# Patient Record
Sex: FEMALE | Race: WHITE | NOT HISPANIC OR LATINO | URBAN - METROPOLITAN AREA
[De-identification: names, ages, dates, MRNs, and addresses within clinical notes are randomized per-mention and may not be internally consistent; named-entity substitution may affect disease eponyms.]

---

## 2022-07-22 ENCOUNTER — HOSPITAL ENCOUNTER (EMERGENCY)
Facility: CLINIC | Age: 52
Discharge: HOME | End: 2022-07-23
Attending: EMERGENCY MEDICINE | Admitting: EMERGENCY MEDICINE
Payer: COMMERCIAL

## 2022-07-22 DIAGNOSIS — R11.2 NON-INTRACTABLE VOMITING WITH NAUSEA, UNSPECIFIED VOMITING TYPE: ICD-10-CM

## 2022-07-22 PROCEDURE — 96361 HYDRATE IV INFUSION ADD-ON: CPT

## 2022-07-22 PROCEDURE — 96375 TX/PRO/DX INJ NEW DRUG ADDON: CPT

## 2022-07-22 PROCEDURE — 258N000003 HC RX IP 258 OP 636: Performed by: EMERGENCY MEDICINE

## 2022-07-22 PROCEDURE — 250N000011 HC RX IP 250 OP 636: Performed by: EMERGENCY MEDICINE

## 2022-07-22 PROCEDURE — 93005 ELECTROCARDIOGRAM TRACING: CPT

## 2022-07-22 PROCEDURE — 99285 EMERGENCY DEPT VISIT HI MDM: CPT | Mod: 25

## 2022-07-22 PROCEDURE — 96374 THER/PROPH/DIAG INJ IV PUSH: CPT

## 2022-07-22 RX ORDER — ONDANSETRON 2 MG/ML
4 INJECTION INTRAMUSCULAR; INTRAVENOUS ONCE
Status: COMPLETED | OUTPATIENT
Start: 2022-07-22 | End: 2022-07-22

## 2022-07-22 RX ORDER — LORAZEPAM 2 MG/ML
1 INJECTION INTRAMUSCULAR ONCE
Status: COMPLETED | OUTPATIENT
Start: 2022-07-22 | End: 2022-07-23

## 2022-07-22 RX ADMIN — SODIUM CHLORIDE 1000 ML: 9 INJECTION, SOLUTION INTRAVENOUS at 23:15

## 2022-07-22 RX ADMIN — ONDANSETRON 4 MG: 2 INJECTION INTRAMUSCULAR; INTRAVENOUS at 23:13

## 2022-07-23 ENCOUNTER — APPOINTMENT (OUTPATIENT)
Dept: GENERAL RADIOLOGY | Facility: CLINIC | Age: 52
End: 2022-07-23
Attending: EMERGENCY MEDICINE
Payer: COMMERCIAL

## 2022-07-23 VITALS
DIASTOLIC BLOOD PRESSURE: 99 MMHG | SYSTOLIC BLOOD PRESSURE: 130 MMHG | RESPIRATION RATE: 20 BRPM | WEIGHT: 293 LBS | OXYGEN SATURATION: 91 % | TEMPERATURE: 97 F | HEART RATE: 65 BPM

## 2022-07-23 LAB
ALBUMIN SERPL-MCNC: 3.5 G/DL (ref 3.4–5)
ALP SERPL-CCNC: 90 U/L (ref 40–150)
ALT SERPL W P-5'-P-CCNC: 28 U/L (ref 0–50)
ANION GAP SERPL CALCULATED.3IONS-SCNC: 4 MMOL/L (ref 3–14)
AST SERPL W P-5'-P-CCNC: 22 U/L (ref 0–45)
ATRIAL RATE - MUSE: 67 BPM
BASOPHILS # BLD AUTO: 0 10E3/UL (ref 0–0.2)
BASOPHILS NFR BLD AUTO: 1 %
BILIRUB SERPL-MCNC: 0.4 MG/DL (ref 0.2–1.3)
BUN SERPL-MCNC: 21 MG/DL (ref 7–30)
CALCIUM SERPL-MCNC: 9 MG/DL (ref 8.5–10.1)
CHLORIDE BLD-SCNC: 108 MMOL/L (ref 94–109)
CO2 SERPL-SCNC: 29 MMOL/L (ref 20–32)
CREAT SERPL-MCNC: 1.22 MG/DL (ref 0.52–1.04)
DIASTOLIC BLOOD PRESSURE - MUSE: NORMAL MMHG
EOSINOPHIL # BLD AUTO: 0 10E3/UL (ref 0–0.7)
EOSINOPHIL NFR BLD AUTO: 0 %
ERYTHROCYTE [DISTWIDTH] IN BLOOD BY AUTOMATED COUNT: 15.9 % (ref 10–15)
GFR SERPL CREATININE-BSD FRML MDRD: 54 ML/MIN/1.73M2
GLUCOSE BLD-MCNC: 125 MG/DL (ref 70–99)
HCT VFR BLD AUTO: 38.9 % (ref 35–47)
HGB BLD-MCNC: 11.9 G/DL (ref 11.7–15.7)
IMM GRANULOCYTES # BLD: 0 10E3/UL
IMM GRANULOCYTES NFR BLD: 0 %
INTERPRETATION ECG - MUSE: NORMAL
LYMPHOCYTES # BLD AUTO: 1.1 10E3/UL (ref 0.8–5.3)
LYMPHOCYTES NFR BLD AUTO: 16 %
MCH RBC QN AUTO: 25.5 PG (ref 26.5–33)
MCHC RBC AUTO-ENTMCNC: 30.6 G/DL (ref 31.5–36.5)
MCV RBC AUTO: 83 FL (ref 78–100)
MONOCYTES # BLD AUTO: 0.3 10E3/UL (ref 0–1.3)
MONOCYTES NFR BLD AUTO: 5 %
NEUTROPHILS # BLD AUTO: 5.2 10E3/UL (ref 1.6–8.3)
NEUTROPHILS NFR BLD AUTO: 78 %
NRBC # BLD AUTO: 0 10E3/UL
NRBC BLD AUTO-RTO: 0 /100
P AXIS - MUSE: 48 DEGREES
PLATELET # BLD AUTO: 272 10E3/UL (ref 150–450)
POTASSIUM BLD-SCNC: 4.7 MMOL/L (ref 3.4–5.3)
PR INTERVAL - MUSE: 160 MS
PROT SERPL-MCNC: 6.8 G/DL (ref 6.8–8.8)
QRS DURATION - MUSE: 88 MS
QT - MUSE: 418 MS
QTC - MUSE: 441 MS
R AXIS - MUSE: 35 DEGREES
RBC # BLD AUTO: 4.67 10E6/UL (ref 3.8–5.2)
SODIUM SERPL-SCNC: 141 MMOL/L (ref 133–144)
SYSTOLIC BLOOD PRESSURE - MUSE: NORMAL MMHG
T AXIS - MUSE: 35 DEGREES
VENTRICULAR RATE- MUSE: 67 BPM
WBC # BLD AUTO: 6.6 10E3/UL (ref 4–11)

## 2022-07-23 PROCEDURE — 85025 COMPLETE CBC W/AUTO DIFF WBC: CPT | Performed by: EMERGENCY MEDICINE

## 2022-07-23 PROCEDURE — 36415 COLL VENOUS BLD VENIPUNCTURE: CPT | Performed by: EMERGENCY MEDICINE

## 2022-07-23 PROCEDURE — 71046 X-RAY EXAM CHEST 2 VIEWS: CPT

## 2022-07-23 PROCEDURE — 250N000011 HC RX IP 250 OP 636: Performed by: EMERGENCY MEDICINE

## 2022-07-23 PROCEDURE — 80053 COMPREHEN METABOLIC PANEL: CPT | Performed by: EMERGENCY MEDICINE

## 2022-07-23 RX ADMIN — LORAZEPAM 1 MG: 2 INJECTION INTRAMUSCULAR at 00:09

## 2022-07-23 ASSESSMENT — ENCOUNTER SYMPTOMS
DIZZINESS: 1
ABDOMINAL PAIN: 0
NERVOUS/ANXIOUS: 1
COUGH: 1
FEVER: 0
NAUSEA: 1
SHORTNESS OF BREATH: 0
VOMITING: 1

## 2022-07-23 NOTE — ED PROVIDER NOTES
"  History   Chief Complaint:  Nausea & Vomiting       The history is provided by the patient.      Yessy Ryan is a 51 year old female who presents with nausea and multiple episodes of vomiting. Symptoms began tonight at 1900 approximately one hour after she ate an edible containing marijuana. Associated symptoms include dizziness and a \"choking\" sensation. She often eats edibles, but this edible was \"delta 10 instead of delta 8\" which was new for her. While waiting in the ER, she received interventions that mildly improved her nausea. Patient has a past medical history including depression and anxiety which she treats with medication. She denies any \"substantial\" previous surgical history.    Review of Systems   Constitutional: Negative for fever.   Respiratory: Positive for cough. Negative for shortness of breath.    Cardiovascular: Negative for chest pain.   Gastrointestinal: Positive for nausea and vomiting. Negative for abdominal pain.   Neurological: Positive for dizziness.   Psychiatric/Behavioral: The patient is nervous/anxious.    All other systems reviewed and are negative.        Allergies:  Morphine     Medications:  Antidepressant     Past Medical History:     Anxiety   Depression     Past Surgical History:    Patient denies \"substantial\" surgical history    Social History:  Present with her .   Often consumes marijuana edibles.       Physical Exam     Patient Vitals for the past 24 hrs:   BP Temp Temp src Pulse Resp SpO2 Weight   07/23/22 0230 (!) 130/99 -- -- 65 -- 91 % --   07/23/22 0215 128/82 -- -- 66 -- 92 % --   07/23/22 0200 137/85 -- -- 65 -- 96 % --   07/23/22 0145 134/85 -- -- 64 -- 97 % --   07/23/22 0130 129/81 -- -- 65 -- 94 % --   07/23/22 0115 131/88 -- -- 70 -- 96 % --   07/23/22 0100 130/76 -- -- 84 -- 91 % --   07/23/22 0000 128/74 -- -- 70 -- (!) 84 % --   07/22/22 2347 -- -- -- -- -- 92 % --   07/22/22 2345 135/81 -- -- 74 -- 95 % --   07/22/22 2344 -- -- -- -- -- (!) 89 " % --   07/22/22 2342 -- -- -- -- -- 91 % --   07/22/22 2340 (!) 141/111 -- -- 69 -- 96 % --   07/22/22 2309 -- -- -- -- -- -- 142.9 kg (315 lb)   07/22/22 2308 -- -- -- -- 20 -- --   07/22/22 2255 116/58 97  F (36.1  C) Temporal 75 -- 96 % --       Physical Exam  General: Appears well-developed and well-nourished.   Head: No signs of trauma.   CV: Normal rate and regular rhythm.    Resp: Effort normal and breath sounds normal. No respiratory distress.   GI: Soft. There is no tenderness.  No rebound or guarding.  Normal bowel sounds.  No CVA tenderness.  MSK: Normal range of motion.   Neuro: The patient is alert and oriented. Speech normal.  Skin: Skin is warm and dry. No rash noted.   Psych: anxious      Emergency Department Course   ECG:  ECG results from 07/22/22   EKG 12-lead, tracing only     Value    Systolic Blood Pressure     Diastolic Blood Pressure     Ventricular Rate 67    Atrial Rate 67    NM Interval 160    QRS Duration 88        QTc 441    P Axis 48    R AXIS 35    T Axis 35    Interpretation ECG      Sinus rhythm  Normal ECG  No previous ECGs available  Confirmed by GENERATED REPORT, COMPUTER ([id number]),  Maico Ramos ([id number]) on 7/23/2022 12:46:30 AM         Imaging:  XR Chest 2 Views   Final Result   IMPRESSION: Mild cardiac enlargement. Normal pulmonary vascularity. Lungs clear. No pleural fluid or pneumothorax. Minimal degenerative changes in the spine and shoulders.        Report per radiology    Laboratory:  Labs Ordered and Resulted from Time of ED Arrival to Time of ED Departure   COMPREHENSIVE METABOLIC PANEL - Abnormal       Result Value    Sodium 141      Potassium 4.7      Chloride 108      Carbon Dioxide (CO2) 29      Anion Gap 4      Urea Nitrogen 21      Creatinine 1.22 (*)     Calcium 9.0      Glucose 125 (*)     Alkaline Phosphatase 90      AST 22      ALT 28      Protein Total 6.8      Albumin 3.5      Bilirubin Total 0.4      GFR Estimate 54 (*)    CBC WITH PLATELETS AND  DIFFERENTIAL - Abnormal    WBC Count 6.6      RBC Count 4.67      Hemoglobin 11.9      Hematocrit 38.9      MCV 83      MCH 25.5 (*)     MCHC 30.6 (*)     RDW 15.9 (*)     Platelet Count 272      % Neutrophils 78      % Lymphocytes 16      % Monocytes 5      % Eosinophils 0      % Basophils 1      % Immature Granulocytes 0      NRBCs per 100 WBC 0      Absolute Neutrophils 5.2      Absolute Lymphocytes 1.1      Absolute Monocytes 0.3      Absolute Eosinophils 0.0      Absolute Basophils 0.0      Absolute Immature Granulocytes 0.0      Absolute NRBCs 0.0            Emergency Department Course:             Reviewed:  I reviewed nursing notes and vitals    Assessments:  2350 I obtained history and examined the patient as noted above.   0215 I rechecked the patient and explained findings.     Interventions:  2313 Zofran 4 mg IV   2315 NS, 1 L, IV  0009 Ativan 1mg IV     Disposition:  The patient was discharged to home.     Impression & Plan       Medical Decision Making:  Yessy Ryan is a 51-year-old woman presents due to vomiting and anxiety.  She reports that symptoms started after eating an edible that had delta 10.  She states that she fairly regularly has delta 8 but the delta 10 was new for her.  Her physical exam was overall reassuring.  EKG did not show any concerning findings and blood work was reassuring.  Patient was concerned that she could have aspirated.  Her lung sounds were clear and she is not in respiratory stress and the chest x-ray was clear.  I did give patient fluids and a dose of Ativan.  With this she did feel better and was able to tolerate p.o.  I believe her symptoms are a reaction to the delta 10 and I recommended abstaining from this.  Patient was discharged home      Diagnosis:    ICD-10-CM    1. Non-intractable vomiting with nausea, unspecified vomiting type  R11.2        Scribe Disclosure:  Deejay ACHARYA, am serving as a scribe at 11:49 PM on 7/22/2022 to document services  personally performed by Phil Leon MD based on my observations and the provider's statements to me.          Phil Leon MD  07/23/22 0461

## 2022-07-23 NOTE — ED TRIAGE NOTES
Triage Assessment     Row Name 07/22/22 2316       Respiratory WDL    Respiratory WDL WDL       Skin Circulation/Temperature WDL    Skin Circulation/Temperature WDL WDL       Cardiac WDL    Cardiac WDL WDL       Peripheral/Neurovascular WDL    Peripheral Neurovascular WDL WDL       Cognitive/Neuro/Behavioral WDL    Cognitive/Neuro/Behavioral WDL WDL

## 2022-08-24 PROBLEM — E66.01 CLASS 3 SEVERE OBESITY IN ADULT (H): Status: ACTIVE | Noted: 2022-08-24

## 2022-08-24 PROBLEM — F41.9 ANXIETY AND DEPRESSION: Status: ACTIVE | Noted: 2022-08-24

## 2022-08-24 PROBLEM — F32.A ANXIETY AND DEPRESSION: Status: ACTIVE | Noted: 2022-08-24

## 2022-08-24 PROBLEM — M13.0 POLYARTHRITIS: Status: ACTIVE | Noted: 2022-08-24

## 2022-08-24 PROBLEM — E66.813 CLASS 3 SEVERE OBESITY IN ADULT (H): Status: ACTIVE | Noted: 2022-08-24

## 2022-08-24 PROBLEM — R19.00 PELVIC MASS: Status: ACTIVE | Noted: 2022-08-24

## 2022-09-27 ENCOUNTER — HOSPITAL ENCOUNTER (EMERGENCY)
Facility: CLINIC | Age: 52
Discharge: HOME | End: 2022-09-27
Payer: COMMERCIAL

## 2022-09-27 VITALS
RESPIRATION RATE: 18 BRPM | OXYGEN SATURATION: 99 % | WEIGHT: 293 LBS | DIASTOLIC BLOOD PRESSURE: 109 MMHG | SYSTOLIC BLOOD PRESSURE: 154 MMHG | BODY MASS INDEX: 44.41 KG/M2 | HEIGHT: 68 IN | HEART RATE: 77 BPM | TEMPERATURE: 98.4 F

## 2022-09-27 DIAGNOSIS — R03.0 ELEVATED BLOOD PRESSURE READING WITHOUT DIAGNOSIS OF HYPERTENSION: ICD-10-CM

## 2022-09-27 DIAGNOSIS — J06.9 VIRAL UPPER RESPIRATORY TRACT INFECTION: ICD-10-CM

## 2022-09-27 DIAGNOSIS — R11.2 NAUSEA AND VOMITING, INTRACTABILITY OF VOMITING NOT SPECIFIED, UNSPECIFIED VOMITING TYPE: ICD-10-CM

## 2022-09-27 DIAGNOSIS — J45.901 MILD ASTHMA EXACERBATION: ICD-10-CM

## 2022-09-27 LAB
ALBUMIN SERPL-MCNC: 3.7 G/DL (ref 3.4–5)
ALP SERPL-CCNC: 97 U/L (ref 40–150)
ALT SERPL W P-5'-P-CCNC: 31 U/L (ref 0–50)
ANION GAP SERPL CALCULATED.3IONS-SCNC: 8 MMOL/L (ref 3–14)
AST SERPL W P-5'-P-CCNC: 26 U/L (ref 0–45)
BASOPHILS # BLD AUTO: 0 10E3/UL (ref 0–0.2)
BASOPHILS NFR BLD AUTO: 0 %
BILIRUB SERPL-MCNC: 0.8 MG/DL (ref 0.2–1.3)
BUN SERPL-MCNC: 16 MG/DL (ref 7–30)
CALCIUM SERPL-MCNC: 9.2 MG/DL (ref 8.5–10.1)
CHLORIDE BLD-SCNC: 105 MMOL/L (ref 94–109)
CO2 SERPL-SCNC: 26 MMOL/L (ref 20–32)
CREAT SERPL-MCNC: 1 MG/DL (ref 0.52–1.04)
EOSINOPHIL # BLD AUTO: 0.1 10E3/UL (ref 0–0.7)
EOSINOPHIL NFR BLD AUTO: 1 %
ERYTHROCYTE [DISTWIDTH] IN BLOOD BY AUTOMATED COUNT: 15.4 % (ref 10–15)
FLUAV RNA SPEC QL NAA+PROBE: NEGATIVE
FLUBV RNA RESP QL NAA+PROBE: NEGATIVE
GFR SERPL CREATININE-BSD FRML MDRD: 68 ML/MIN/1.73M2
GLUCOSE BLD-MCNC: 122 MG/DL (ref 70–99)
HCT VFR BLD AUTO: 42.7 % (ref 35–47)
HGB BLD-MCNC: 13.3 G/DL (ref 11.7–15.7)
IMM GRANULOCYTES # BLD: 0 10E3/UL
IMM GRANULOCYTES NFR BLD: 0 %
LYMPHOCYTES # BLD AUTO: 0.6 10E3/UL (ref 0.8–5.3)
LYMPHOCYTES NFR BLD AUTO: 8 %
MCH RBC QN AUTO: 26.1 PG (ref 26.5–33)
MCHC RBC AUTO-ENTMCNC: 31.1 G/DL (ref 31.5–36.5)
MCV RBC AUTO: 84 FL (ref 78–100)
MONOCYTES # BLD AUTO: 0.3 10E3/UL (ref 0–1.3)
MONOCYTES NFR BLD AUTO: 5 %
NEUTROPHILS # BLD AUTO: 5.9 10E3/UL (ref 1.6–8.3)
NEUTROPHILS NFR BLD AUTO: 86 %
NRBC # BLD AUTO: 0 10E3/UL
NRBC BLD AUTO-RTO: 0 /100
PLATELET # BLD AUTO: 276 10E3/UL (ref 150–450)
POTASSIUM BLD-SCNC: 4.1 MMOL/L (ref 3.4–5.3)
PROT SERPL-MCNC: 7.8 G/DL (ref 6.8–8.8)
RBC # BLD AUTO: 5.1 10E6/UL (ref 3.8–5.2)
RSV RNA SPEC NAA+PROBE: NEGATIVE
SARS-COV-2 RNA RESP QL NAA+PROBE: NEGATIVE
SODIUM SERPL-SCNC: 139 MMOL/L (ref 133–144)
WBC # BLD AUTO: 7 10E3/UL (ref 4–11)

## 2022-09-27 PROCEDURE — 258N000003 HC RX IP 258 OP 636: Performed by: EMERGENCY MEDICINE

## 2022-09-27 PROCEDURE — 85004 AUTOMATED DIFF WBC COUNT: CPT | Performed by: EMERGENCY MEDICINE

## 2022-09-27 PROCEDURE — 96361 HYDRATE IV INFUSION ADD-ON: CPT

## 2022-09-27 PROCEDURE — 250N000011 HC RX IP 250 OP 636: Performed by: EMERGENCY MEDICINE

## 2022-09-27 PROCEDURE — 87637 SARSCOV2&INF A&B&RSV AMP PRB: CPT | Performed by: EMERGENCY MEDICINE

## 2022-09-27 PROCEDURE — 96374 THER/PROPH/DIAG INJ IV PUSH: CPT

## 2022-09-27 PROCEDURE — 96375 TX/PRO/DX INJ NEW DRUG ADDON: CPT

## 2022-09-27 PROCEDURE — 99284 EMERGENCY DEPT VISIT MOD MDM: CPT | Mod: 25,CS

## 2022-09-27 PROCEDURE — C9803 HOPD COVID-19 SPEC COLLECT: HCPCS

## 2022-09-27 PROCEDURE — 80053 COMPREHEN METABOLIC PANEL: CPT | Performed by: EMERGENCY MEDICINE

## 2022-09-27 PROCEDURE — 85025 COMPLETE CBC W/AUTO DIFF WBC: CPT | Performed by: EMERGENCY MEDICINE

## 2022-09-27 PROCEDURE — 36415 COLL VENOUS BLD VENIPUNCTURE: CPT | Performed by: EMERGENCY MEDICINE

## 2022-09-27 PROCEDURE — 250N000013 HC RX MED GY IP 250 OP 250 PS 637

## 2022-09-27 RX ORDER — ONDANSETRON 2 MG/ML
4 INJECTION INTRAMUSCULAR; INTRAVENOUS ONCE
Status: COMPLETED | OUTPATIENT
Start: 2022-09-27 | End: 2022-09-27

## 2022-09-27 RX ORDER — ACETAMINOPHEN 325 MG/1
650 TABLET ORAL ONCE
Status: COMPLETED | OUTPATIENT
Start: 2022-09-27 | End: 2022-09-27

## 2022-09-27 RX ORDER — ONDANSETRON 4 MG/1
4 TABLET, ORALLY DISINTEGRATING ORAL ONCE
Status: DISCONTINUED | OUTPATIENT
Start: 2022-09-27 | End: 2022-09-27 | Stop reason: HOSPADM

## 2022-09-27 RX ORDER — IBUPROFEN 400 MG/1
400 TABLET, FILM COATED ORAL ONCE
Status: COMPLETED | OUTPATIENT
Start: 2022-09-27 | End: 2022-09-27

## 2022-09-27 RX ORDER — DEXAMETHASONE SODIUM PHOSPHATE 4 MG/ML
10 INJECTION, SOLUTION INTRA-ARTICULAR; INTRALESIONAL; INTRAMUSCULAR; INTRAVENOUS; SOFT TISSUE ONCE
Status: COMPLETED | OUTPATIENT
Start: 2022-09-27 | End: 2022-09-27

## 2022-09-27 RX ORDER — ONDANSETRON 4 MG/1
4 TABLET, ORALLY DISINTEGRATING ORAL EVERY 8 HOURS PRN
Qty: 10 TABLET | Refills: 0 | Status: SHIPPED | OUTPATIENT
Start: 2022-09-27 | End: 2022-09-30

## 2022-09-27 RX ADMIN — ACETAMINOPHEN 650 MG: 325 TABLET, FILM COATED ORAL at 11:40

## 2022-09-27 RX ADMIN — DEXAMETHASONE SODIUM PHOSPHATE 10 MG: 4 INJECTION, SOLUTION INTRAMUSCULAR; INTRAVENOUS at 12:42

## 2022-09-27 RX ADMIN — ONDANSETRON 4 MG: 2 INJECTION INTRAMUSCULAR; INTRAVENOUS at 09:12

## 2022-09-27 RX ADMIN — IBUPROFEN 400 MG: 400 TABLET, FILM COATED ORAL at 11:40

## 2022-09-27 RX ADMIN — SODIUM CHLORIDE 1000 ML: 9 INJECTION, SOLUTION INTRAVENOUS at 09:11

## 2022-09-27 ASSESSMENT — ENCOUNTER SYMPTOMS
SHORTNESS OF BREATH: 0
SPEECH DIFFICULTY: 0
NUMBNESS: 0
CONFUSION: 0
VOMITING: 1
BACK PAIN: 0
SORE THROAT: 1
ABDOMINAL PAIN: 0
CHILLS: 0
FEVER: 1
WEAKNESS: 0
FACIAL ASYMMETRY: 0
DIZZINESS: 0
HEADACHES: 1
COUGH: 1
NAUSEA: 1

## 2022-09-27 NOTE — DISCHARGE INSTRUCTIONS
Discharge Instructions  Upper Respiratory Infection    The upper respiratory tract includes the sinuses, nasal passages, pharynx, and larynx. A URI, or upper respiratory infection, is an infection of any of the parts of the upper airway. Symptoms include runny nose, congestion, sneezing, sore throat, cough, and fever. URIs are almost always caused by a virus. Antibiotics do not help with viral infections, so are generally not prescribed. A URI is very contagious through coughing and nasal secretions; make sure you wash your hands often and clean surfaces after sneezing, coughing or touching them. While you should start to improve in 3 - 5 days, remember that sometimes a cough can linger for several weeks.    Generally, every Emergency Department visit should have a follow-up clinic visit with either a primary or a specialty clinic/provider. Please follow-up as instructed by your emergency provider today.    Return to the Emergency Department if:  Any of your symptoms get much worse.  You seem very sick, like being too weak to get up.  You have chest pain or shortness of breath.   You have a severe headache.  You are vomiting (throwing up) so much you cannot keep fluids or medicines down.  You have confusion or seem unusually drowsy.  You have a seizure.    What can I do to help myself?  Fill any prescriptions the provider gave you and take them right away  If you have a fever, get plenty of rest and drink lots of fluids, especially water.  Using a humidifier or saline nose spray will also help loosen mucous.   Clothes or blankets will not change your fever. Do what is comfortable for you.  Bathing or sponging in lukewarm water may help you feel better.  Acetaminophen (Tylenol ) or ibuprofen (Advil , Motrin ) will help bring fever down and may help you feel more comfortable. Be sure to read and follow the package directions, and ask your provider if you have questions.  Do not drink alcohol.  Decongestants may help  you feel better. You may use decongestant nose sprays Afrin  (oxymetazoline) or Glynn-Synephrine  (phenylephrine hydrochloride) for up to 3 days, or may use a decongestant tablet like Sudafed  (pseudoephedrine).  If you were given a prescription for medicine here today, be sure to read all of the information (including the package insert) that comes with your prescription.  This will include important information about the medicine, its side effects, and any warnings that you need to know about.  The pharmacist who fills the prescription can provide more information and answer questions you may have about the medicine.  If you have questions or concerns that the pharmacist cannot address, please call or return to the Emergency Department.   Remember that you can always come back to the Emergency Department if you are not able to see your regular provider in the amount of time listed above, if you get any new symptoms, or if there is anything that worries you.    Discharge Instructions  Vomiting    You have been seen today for vomiting (throwing up). This is usually caused by a virus, but some bacteria, parasites, medicines or other medical conditions can cause similar symptoms. At this time your provider does not find that your vomiting is a sign of anything dangerous or life-threatening. However, sometimes the signs of serious illness do not show up right away. If you have new or worse symptoms, you may need to be seen again in the Emergency Department or by your primary provider. Remember that serious problems like appendicitis can start as vomiting.    Generally, every Emergency Department visit should have a follow-up clinic visit with either a primary or a specialty clinic/provider. Please follow-up as instructed by your emergency provider today.    Return to the Emergency Department if:  You keep vomiting and you are not able to keep liquids down.   You feel you are getting dehydrated, such as being very  thirsty, not urinating (peeing) at least every 8-12 hours, or feeling faint or lightheaded.   You develop a new fever, or your fever continues for more than 2 days.   You have abdominal (belly pain) that seems worse than cramps, is in one spot, or is getting worse over time. Appendicitis usually causes pain in the right lower abdomen (to the right and below your belly button) so watch for pain in this location.  You have blood in your vomit or stools.   You feel very weak.  You are not starting to improve within 24 hours of your visit here.     What can I do to help myself?  The most important thing to do is to drink clear liquids. If you have been vomiting a lot, it is best to have only small, frequent sips of liquids. Drinking too much at once may cause more vomiting. If you are vomiting often, you must replace minerals, sodium and potassium lost with your illness. Pedialyte  is the best available rehydration liquid but some find that it doesn t taste good so sports drinks are an alterative. You can also drink clear liquids such as water, weak tea, apple juice, and 7-Up . Avoid acid liquids (orange), caffeine (coffee) or alcohol. Do not drink milk until you no longer have diarrhea (loose stools).   After liquids are staying down, you may start eating mild foods. Soda crackers, toast, plain noodles, gelatin, applesauce and bananas are good first choices. Avoid foods that have acid, are spicy, fatty or have a lot of fiber (such as meats, coarse grains, vegetables). You may start eating these foods again in about 3 days when you are better.   Sometimes treatment includes prescription medicine to prevent nausea (sick to your stomach) and vomiting. If your provider prescribes these for you, take them as directed.   Do not take ibuprofen, naproxen, or other nonsteroidal anti-inflammatory (NSAID) medicines without checking with your healthcare provider.     If you were given a prescription for medicine here today, be  sure to read all of the information (including the package insert) that comes with your prescription.  This will include important information about the medicine, its side effects, and any warnings that you need to know about.  The pharmacist who fills the prescription can provide more information and answer questions you may have about the medicine.  If you have questions or concerns that the pharmacist cannot address, please call or return to the Emergency Department.     Remember that you can always come back to the Emergency Department if you are not able to see your regular provider in the amount of time listed above, if you get any new symptoms, or if there is anything that worries you.    Discharge Instructions  Hypertension - High Blood Pressure    During you visit to the Emergency Department, your blood pressure was higher than the recommended blood pressure.  This may be related to stress, pain, medication or other temporary conditions. In these cases, your blood pressure may return to normal on its own. If you have a history of high blood pressure, you may need to have your provider adjust your medications. Sometimes, your high measurement here may indicate that you have developed high blood pressure that will stay high unless it is treated. As a general rule, high blood pressure causes problems over years rather than days, weeks, or months. So, while it is important to treat blood pressure, it is rarely important to treat blood pressure immediately. Occasionally we will begin a medication in the Emergency Department; more often we will recommend close follow-up for medications with a primary doctor/clinic.    Generally, every Emergency Department visit should have a follow-up clinic visit with either a primary or a specialty clinic/provider. Please follow-up as instructed by your emergency provider today.    Return to the Emergency Department if you start to have:  A severe headache.  Chest  pain.  Shortness of breath.  Weakness or numbness that affects one part of the body.  Confusion.  Vision changes.  Significant swelling of legs and/or eyes.  A reaction to any medication started in the Emergency Department.    What can I do to help myself?  Avoid alcohol.  Take any blood pressure medicine that you are prescribed.  Get a good night s sleep.  Lower your salt intake.  Exercise.  Lose weight.  Manage stress.  See your doctor regularly    If blood pressure medication was started in the Emergency Department:  The medicine may not have an immediate effect. The body and brain determine what blood pressure you have. The medicine s job is to retrain the body s  thermostat  to a lower blood pressure.  You will need to follow up with your provider to see how this medicine is working for you.  If you were given a prescription for medicine here today, be sure to read all of the information (including the package insert) that comes with your prescription.  This will include important information about the medicine, its side effects, and any warnings that you need to know about.  The pharmacist who fills the prescription can provide more information and answer questions you may have about the medicine.  If you have questions or concerns that the pharmacist cannot address, please call or return to the Emergency Department.   Remember that you can always come back to the Emergency Department if you are not able to see your regular provider in the amount of time listed above, if you get any new symptoms, or if there is anything that worries you.    Discharge Instructions  Asthma    Asthma is a condition causing narrowing and inflammation of the airways that can make it hard to breathe.  Asthma can also cause cough, wheezing, noisy breathing and tightness in the chest.  Asthma can be brought on or  triggered  by many things, including dust, mold, pollen, cigarette smoke, exercise, stress and infections (like the common  cold).     Generally, every Emergency Department visit should have a follow-up clinic visit with either a primary or a specialty clinic/provider. Please follow-up as instructed by your emergency provider today.    Return to the Emergency Department if:  Your breathing gets worse.  You need to use your inhaler more often than every 4 hours, or cannot get relief from your inhaler.  You are very weak, or feel much more ill.  You develop new symptoms, such as chest pain.  You cough up blood.  You are vomiting (throwing up) enough that you cannot keep fluids or your medicine down.    What can I do to help myself?  Fill any prescriptions the provider gave you and take them right away--especially antibiotics. Be sure to finish the whole antibiotic prescription.  You may be given a prescription for an inhaler, which can help loosen tight air passages.  Use this as needed, but not more often than directed. Inhalers work much better when used with a spacer.   You may be given a prescription for a steroid to reduce inflammation. Used long-term, these can have some side effects, but for short-term use they are safe. You may notice restlessness or increased appetite (eating more).      You may use non-prescription cough or cold medicines. Cough medicines may help, but do not make the cough go away completely.   Avoid smoke, because this can make you feel worse. If you smoke, this may be a good time to quit! Consider using nicotine lozenges, gum, or patches to reduce cravings.   If you have a fever, Tylenol  (acetaminophen), Motrin  (ibuprofen), or Advil  (ibuprofen), may help bring fever down and may help you feel more comfortable. Be sure to read and follow the package directions, and ask your provider if you have questions.  Be sure to get your flu shot each year.  For certain age groups, the pneumonia shot can help prevent pneumonia.  It is important that you follow up with your regular provider, to be sure that you are  improving from this spell (an acute asthma exacerbation), and also to do what you can to keep from having trouble again. Sometimes you need long-term medicines to keep your asthma under control.   If you were given a prescription for medicine here today, be sure to read all of the information (including the package insert) that comes with your prescription.  This will include important information about the medicine, its side effects, and any warnings that you need to know about.  The pharmacist who fills the prescription can provide more information and answer questions you may have about the medicine.  If you have questions or concerns that the pharmacist cannot address, please call or return to the Emergency Department.   Remember that you can always come back to the Emergency Department if you are not able to see your regular provider in the amount of time listed above, if you get any new symptoms, or if there is anything that worries you.

## 2022-09-27 NOTE — ED PROVIDER NOTES
"Emergency Department Attending Supervision Note  9/27/2022  12:09 PM      I evaluated this patient in conjunction with Eugenie Ferris PA-C      Briefly, the patient presented with vomiting. The patient reports she has had 2 days of fever, cough, nausea and vomiting. She states that she has had trouble keeping anything down. She is also having a mild asthma exacerbation and feels like she is wheezing more currently. She has not used her inhaler yet today. She denies abdominal pain, diarrhea or bloody vomit. No history of stomach ulcers or blood thinner use.      On my exam,   BP (!) 154/109   Pulse 77   Temp 98.4  F (36.9  C) (Temporal)   Resp 18   Ht 1.727 m (5' 8\")   Wt 145.2 kg (320 lb)   SpO2 99%   BMI 48.66 kg/m      General: Alert and cooperative with exam. Patient in mild distress. Normal mentation. Overweight   Head:  Scalp is NC/AT  Eyes:  No scleral icterus, PERRL  ENT:  The external nose and ears are normal. The oropharynx is normal and without erythema; mucus membranes are moist. Uvula midline, no evidence of deep space infection.  Neck:  Normal range of motion without rigidity.  CV:  Regular rate and rhythm    No pathologic murmur   Resp:  Intermittent mild expiratory wheezing     Non-labored, no retractions or accessory muscle use  GI:  Abdomen is soft, no distension, no tenderness. No peritoneal signs  MS:  No lower extremity edema   Skin:  Warm and dry, No rash or lesions noted.  Neuro: Oriented x 3. No gross motor deficits.    Results:    No orders to display     Labs Ordered and Resulted from Time of ED Arrival to Time of ED Departure   COMPREHENSIVE METABOLIC PANEL - Abnormal       Result Value    Sodium 139      Potassium 4.1      Chloride 105      Carbon Dioxide (CO2) 26      Anion Gap 8      Urea Nitrogen 16      Creatinine 1.00      Calcium 9.2      Glucose 122 (*)     Alkaline Phosphatase 97      AST 26      ALT 31      Protein Total 7.8      Albumin 3.7      Bilirubin Total 0.8      " GFR Estimate 68     CBC WITH PLATELETS AND DIFFERENTIAL - Abnormal    WBC Count 7.0      RBC Count 5.10      Hemoglobin 13.3      Hematocrit 42.7      MCV 84      MCH 26.1 (*)     MCHC 31.1 (*)     RDW 15.4 (*)     Platelet Count 276      % Neutrophils 86      % Lymphocytes 8      % Monocytes 5      % Eosinophils 1      % Basophils 0      % Immature Granulocytes 0      NRBCs per 100 WBC 0      Absolute Neutrophils 5.9      Absolute Lymphocytes 0.6 (*)     Absolute Monocytes 0.3      Absolute Eosinophils 0.1      Absolute Basophils 0.0      Absolute Immature Granulocytes 0.0      Absolute NRBCs 0.0     INFLUENZA A/B & SARS-COV2 PCR MULTIPLEX - Normal    Influenza A PCR Negative      Influenza B PCR Negative      RSV PCR Negative      SARS CoV2 PCR Negative         ED course:    My impression is Yessy Ryan is a 52 year old female who presents for evaluation of vomiting and cough.  This is consistent with an upper respiratory tract infection/viral syndrome.  There is no signs at this point of serious bacterial infection such as OM, epiglottitis, PTA, strep pharyngitis, pneumonia, sinusitis, meningitis, bacteremia, serious bacterial infection.  Patient does have a history of asthma and has very mild wheezing on exam.  Recommended continued use of previously prescribed MDI and provided 10 mg oral Decadron in the ED.  Patient is able to tolerate oral intake.  Abdominal exam is benign.  No indication for emergent imaging.  Labs reassuring as above including negative COVID testing.  Discussed continued supportive care and return precautions.  Patient discharged home.     Diagnosis    ICD-10-CM    1. Viral upper respiratory tract infection  J06.9    2. Nausea and vomiting, intractability of vomiting not specified, unspecified vomiting type  R11.2    3. Elevated blood pressure reading without diagnosis of hypertension  R03.0    4. Mild asthma exacerbation  J45.901          Stephane Osorio DO [place],  Stephane Rosado,   09/27/22 2056

## 2022-09-27 NOTE — ED TRIAGE NOTES
For the past 2 days having coughing, fever 102, runny nose, vomiting started this am. Having an ovarian cyst removed on Thursday, no abd pain     Triage Assessment     Row Name 09/27/22 0893       Triage Assessment (Adult)    Airway WDL WDL       Respiratory WDL    Respiratory WDL cough       Skin Circulation/Temperature WDL    Skin Circulation/Temperature WDL WDL       Cognitive/Neuro/Behavioral WDL    Cognitive/Neuro/Behavioral WDL WDL

## 2022-09-27 NOTE — ED PROVIDER NOTES
History     Chief Complaint:  Nausea & Vomiting, Fever, Cough, and Nasal Congestion      HPI   Yessy Ryan is a 52 year old female history of anxiety, depression and asthma who presents to the emergency department today for evaluation of 2 days of cough, fever, runny nose, sore throat and headache.  The patient states she took a COVID test at home which was negative.  She decided to come in today because around 4 AM, she started vomiting.  She denies any abdominal pain.  No chest pain, shortness of breath.  States she has asthma and uses an inhaler so sometimes experiences wheezing.  Complains that her headache is 5/10 right now.  No visual issues.  She does not typically get migraine headaches.  She denies any speech changes, one-sided weakness, facial droop, visual disturbance, numbness or tingling.  Patient mentions that her sore throat has resolved since all of her symptoms came on.  Patient states that she is due to have surgery for an ovarian cyst removal this week but she is planning to reschedule.  She denies any alcohol use, smoking or marijuana use.  Here in the room, she states she has not vomited since she received antiemetics from nursing.  She also feels a bit improved after the fluid bolus she was given.  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    Review of Systems   Constitutional: Positive for fever. Negative for chills.   HENT: Positive for congestion and sore throat. Negative for ear pain.    Eyes: Negative for visual disturbance.   Respiratory: Positive for cough. Negative for shortness of breath.    Cardiovascular: Negative for chest pain.   Gastrointestinal: Positive for nausea and vomiting. Negative for abdominal pain.   Musculoskeletal: Negative for back pain.   Skin: Negative for rash.   Neurological: Positive for headaches. Negative for dizziness, facial asymmetry, speech difficulty, weakness and  "numbness.   Psychiatric/Behavioral: Negative for confusion.   All other systems reviewed and are negative.    Allergies:  Morphine  Percocet [Oxycodone-Acetaminophen]  Tetrahydrocannabinol (Thc Non-Synthetic) [Tetrahydrocannabinol]    Medications:    Albuterol inhaler  Qvar redihaler  Bupropion  Lexapro    Past Medical History:    Pelvic mass  Anxiety and depression  Polyarthritis   Class 3 severe obesity    Past Surgical History:    Orthopedic surgeries  Tonsillectomy  Sheldahl teeth extraction    Family History:    No concerning family medical history    Social History:  Patient works as a speech pathologist  Denies alcohol use, smoking or marijuana use  Clinic, [facility] [place]  The patient presents to the ED alone    Physical Exam     Patient Vitals for the past 24 hrs:   BP Temp Temp src Pulse Resp SpO2 Height Weight   09/27/22 0856 (!) 157/87 98.4  F (36.9  C) Temporal 83 18 95 % 1.727 m (5' 8\") 145.2 kg (320 lb)       Physical Exam  General: Pleasant middle-aged female laying on hospital [facility].  Nontoxic appearing.  Elevated BMI.  HENT: Patient wearing face mask. When taken off, mucous membranes appear moist.  Eyes: Wears glasses.  Conjunctive and sclera clear.  EOMs intact.  PERRLA.  CV: Regular rate and rhythm. Normal S1, S2. No appreciable murmurs, gallops or rubs.  Resp: Occasional wheezes on auscultation. Normal respiratory effort. Speaks in full sentences. No cough observed.  GI: Abdomen soft, non distended and nontender. No rebound or guarding.  MSK: Moves all extremities without difficulty. No lower extremity edema.  Skin: Warm and dry.  No rashes.  Neuro: Awake, alert, oriented x3.  GCS 15.  Cranial nerves II through XII intact.  No facial droop.  Normal and fluent speech.  Normal and symmetric finger .  Normal and equal strength in all extremities.  Psych: Cooperative. Normal affect.    Emergency Department Course     Laboratory:  Labs Ordered and Resulted from Time of ED Arrival to Time of " ED Departure   COMPREHENSIVE METABOLIC PANEL - Abnormal       Result Value    Sodium 139      Potassium 4.1      Chloride 105      Carbon Dioxide (CO2) 26      Anion Gap 8      Urea Nitrogen 16      Creatinine 1.00      Calcium 9.2      Glucose 122 (*)     Alkaline Phosphatase 97      AST 26      ALT 31      Protein Total 7.8      Albumin 3.7      Bilirubin Total 0.8      GFR Estimate 68     CBC WITH PLATELETS AND DIFFERENTIAL - Abnormal    WBC Count 7.0      RBC Count 5.10      Hemoglobin 13.3      Hematocrit 42.7      MCV 84      MCH 26.1 (*)     MCHC 31.1 (*)     RDW 15.4 (*)     Platelet Count 276      % Neutrophils 86      % Lymphocytes 8      % Monocytes 5      % Eosinophils 1      % Basophils 0      % Immature Granulocytes 0      NRBCs per 100 WBC 0      Absolute Neutrophils 5.9      Absolute Lymphocytes 0.6 (*)     Absolute Monocytes 0.3      Absolute Eosinophils 0.1      Absolute Basophils 0.0      Absolute Immature Granulocytes 0.0      Absolute NRBCs 0.0     INFLUENZA A/B & SARS-COV2 PCR MULTIPLEX - Normal    Influenza A PCR Negative      Influenza B PCR Negative      RSV PCR Negative      SARS CoV2 PCR Negative           Emergency Department Course:    Reviewed:  I reviewed nursing notes, vitals and past medical history    Assessments:  1130 I obtained history and examined the patient as noted above.   1211    I staffed the patient with Dr. Cordero, who evaluated her as well prior to my discharge.    Interventions:  Medications   ondansetron (ZOFRAN ODT) ODT tab 4 mg (has no administration in time range)   dexamethasone (DECADRON) injection 10 mg (has no administration in time range)   0.9% sodium chloride BOLUS (0 mLs Intravenous Stopped 9/27/22 1118)   ondansetron (ZOFRAN) injection 4 mg (4 mg Intravenous Given 9/27/22 0912)   ibuprofen (ADVIL/MOTRIN) tablet 400 mg (400 mg Oral Given 9/27/22 1140)   acetaminophen (TYLENOL) tablet 650 mg (650 mg Oral Given 9/27/22 1140)       Disposition:  The patient  was discharged to home.     Impression & Plan      Medical Decision Making:  This is a pleasant 52-year-old female who was seen and evaluated here in the emergency department for cough, fever, runny nose, sore throat, headache and vomiting for HPI above.  Symptoms started 2 days ago.  Constellation of symptoms consistent with URI. On arrival, the patient was afebrile.  Blood pressure was mildly elevated.  She was not hypoxic and she was not tachycardic.  On exam I appreciated occasional wheezing to auscultation but no signs of respiratory distress.  Decadron was given and she will use her home inhalers. Posterior oropharynx was unremarkable with no swelling, redness or exudate.  Uvula was midline and she was not drooling or experiencing stridor.  Regarding the patient's headache it was a 5 out of 10 she was given IV fluids and Tylenol and ibuprofen with improvement.  She had no focal neurologic deficits.  Swabs were obtained and negative for COVID and flu. The patiet's vomiting started at 4 AM and she estimates about 10 episodes.  Vomiting subsided here in the ED with antiemetics per above and she was given a prescription for home.   CBC showed no leukocytosis concerning for infection.  She was not anemic.  Electrolytes were normal and there was no uptake in her liver function test or decline in her kidney function.  Abdomen was completely nontender on exam and she was denying any abdominal pain.  She is scheduled to get surgery coming up for an ovarian cyst removal but was completely asymptomatic at this time.  All findings were explained to the patient, who expressed understanding.  She was discharged home with anticipatory guidance to return if she develops high fever, severe abdominal pain, chest pain, shortness of breath or anything else new or worsening.  Of note I did note the patient to have a mildly elevated blood pressure at 157 systolic.  She will follow-up with her PCP for recheck on this.  She has no  prior diagnosis of hypertension.    Diagnosis:    ICD-10-CM    1. Viral upper respiratory tract infection  J06.9    2. Nausea and vomiting, intractability of vomiting not specified, unspecified vomiting type  R11.2    3. Elevated blood pressure reading without diagnosis of hypertension  R03.0    4. Mild asthma exacerbation  J45.901        Discharge Medications:  New Prescriptions    ONDANSETRON (ZOFRAN ODT) 4 MG ODT TAB    Take 1 tablet (4 mg) by mouth every 8 hours as needed for nausea       Eugenie LO APC-T  I saw and evaluated this patient under attending provider Dr. Chung.       Eugenie Ferris PA-C  09/27/22 1234

## 2022-10-28 RX ORDER — ONDANSETRON 4 MG/1
4 TABLET, ORALLY DISINTEGRATING ORAL EVERY 8 HOURS PRN
COMMUNITY

## 2022-10-28 RX ORDER — BECLOMETHASONE DIPROPIONATE HFA 80 UG/1
1 AEROSOL, METERED RESPIRATORY (INHALATION) 2 TIMES DAILY
COMMUNITY

## 2022-10-28 RX ORDER — ALBUTEROL SULFATE 90 UG/1
1-2 AEROSOL, METERED RESPIRATORY (INHALATION) EVERY 4 HOURS PRN
COMMUNITY

## 2022-10-28 RX ORDER — LEVOCETIRIZINE DIHYDROCHLORIDE 5 MG/1
5 TABLET, FILM COATED ORAL EVERY EVENING
COMMUNITY

## 2022-10-28 RX ORDER — BUPROPION HYDROCHLORIDE 150 MG/1
150 TABLET ORAL EVERY MORNING
COMMUNITY

## 2022-10-28 RX ORDER — OMEPRAZOLE 40 MG/1
40 CAPSULE, DELAYED RELEASE ORAL DAILY
COMMUNITY

## 2022-10-31 ENCOUNTER — ANESTHESIA EVENT (OUTPATIENT)
Dept: SURGERY | Facility: CLINIC | Age: 52
End: 2022-10-31
Payer: COMMERCIAL

## 2022-10-31 ENCOUNTER — ANESTHESIA (OUTPATIENT)
Dept: SURGERY | Facility: CLINIC | Age: 52
End: 2022-10-31
Payer: COMMERCIAL

## 2022-10-31 ENCOUNTER — HOSPITAL ENCOUNTER (OUTPATIENT)
Facility: CLINIC | Age: 52
Discharge: HOME | End: 2022-10-31
Attending: OBSTETRICS & GYNECOLOGY | Admitting: OBSTETRICS & GYNECOLOGY
Payer: COMMERCIAL

## 2022-10-31 ENCOUNTER — TRANSFERRED RECORDS (OUTPATIENT)
Dept: SURGERY | Facility: CLINIC | Age: 52
End: 2022-10-31

## 2022-10-31 VITALS
BODY MASS INDEX: 44.41 KG/M2 | HEIGHT: 68 IN | RESPIRATION RATE: 12 BRPM | WEIGHT: 293 LBS | OXYGEN SATURATION: 97 % | TEMPERATURE: 97 F | HEART RATE: 58 BPM | DIASTOLIC BLOOD PRESSURE: 75 MMHG | SYSTOLIC BLOOD PRESSURE: 127 MMHG

## 2022-10-31 DIAGNOSIS — R19.00 PELVIC MASS: Primary | ICD-10-CM

## 2022-10-31 LAB — SARS-COV-2 RNA RESP QL NAA+PROBE: NEGATIVE

## 2022-10-31 PROCEDURE — 88331 PATH CONSLTJ SURG 1 BLK 1SPC: CPT | Mod: TC | Performed by: OBSTETRICS & GYNECOLOGY

## 2022-10-31 PROCEDURE — 250N000011 HC RX IP 250 OP 636: Performed by: PHYSICIAN ASSISTANT

## 2022-10-31 PROCEDURE — 250N000013 HC RX MED GY IP 250 OP 250 PS 637: Performed by: OBSTETRICS & GYNECOLOGY

## 2022-10-31 PROCEDURE — 250N000011 HC RX IP 250 OP 636: Performed by: NURSE ANESTHETIST, CERTIFIED REGISTERED

## 2022-10-31 PROCEDURE — 250N000009 HC RX 250: Performed by: ANESTHESIOLOGY

## 2022-10-31 PROCEDURE — 250N000013 HC RX MED GY IP 250 OP 250 PS 637: Performed by: PHYSICIAN ASSISTANT

## 2022-10-31 PROCEDURE — 370N000017 HC ANESTHESIA TECHNICAL FEE, PER MIN: Performed by: OBSTETRICS & GYNECOLOGY

## 2022-10-31 PROCEDURE — 710N000012 HC RECOVERY PHASE 2, PER MINUTE: Performed by: OBSTETRICS & GYNECOLOGY

## 2022-10-31 PROCEDURE — 258N000001 HC RX 258: Performed by: OBSTETRICS & GYNECOLOGY

## 2022-10-31 PROCEDURE — 250N000009 HC RX 250: Performed by: NURSE ANESTHETIST, CERTIFIED REGISTERED

## 2022-10-31 PROCEDURE — 999N000141 HC STATISTIC PRE-PROCEDURE NURSING ASSESSMENT: Performed by: OBSTETRICS & GYNECOLOGY

## 2022-10-31 PROCEDURE — 88305 TISSUE EXAM BY PATHOLOGIST: CPT | Mod: 26 | Performed by: PATHOLOGY

## 2022-10-31 PROCEDURE — 360N000080 HC SURGERY LEVEL 7, PER MIN: Performed by: OBSTETRICS & GYNECOLOGY

## 2022-10-31 PROCEDURE — 250N000009 HC RX 250: Performed by: OBSTETRICS & GYNECOLOGY

## 2022-10-31 PROCEDURE — 710N000009 HC RECOVERY PHASE 1, LEVEL 1, PER MIN: Performed by: OBSTETRICS & GYNECOLOGY

## 2022-10-31 PROCEDURE — 272N000001 HC OR GENERAL SUPPLY STERILE: Performed by: OBSTETRICS & GYNECOLOGY

## 2022-10-31 PROCEDURE — 250N000011 HC RX IP 250 OP 636: Performed by: OBSTETRICS & GYNECOLOGY

## 2022-10-31 PROCEDURE — 250N000011 HC RX IP 250 OP 636: Performed by: ANESTHESIOLOGY

## 2022-10-31 PROCEDURE — 258N000003 HC RX IP 258 OP 636: Performed by: ANESTHESIOLOGY

## 2022-10-31 PROCEDURE — U0003 INFECTIOUS AGENT DETECTION BY NUCLEIC ACID (DNA OR RNA); SEVERE ACUTE RESPIRATORY SYNDROME CORONAVIRUS 2 (SARS-COV-2) (CORONAVIRUS DISEASE [COVID-19]), AMPLIFIED PROBE TECHNIQUE, MAKING USE OF HIGH THROUGHPUT TECHNOLOGIES AS DESCRIBED BY CMS-2020-01-R: HCPCS | Performed by: ANESTHESIOLOGY

## 2022-10-31 PROCEDURE — 88331 PATH CONSLTJ SURG 1 BLK 1SPC: CPT | Mod: 26 | Performed by: PATHOLOGY

## 2022-10-31 RX ORDER — SCOLOPAMINE TRANSDERMAL SYSTEM 1 MG/1
1 PATCH, EXTENDED RELEASE TRANSDERMAL
Status: DISCONTINUED | OUTPATIENT
Start: 2022-10-31 | End: 2022-10-31 | Stop reason: HOSPADM

## 2022-10-31 RX ORDER — MAGNESIUM HYDROXIDE 1200 MG/15ML
LIQUID ORAL PRN
Status: DISCONTINUED | OUTPATIENT
Start: 2022-10-31 | End: 2022-10-31 | Stop reason: HOSPADM

## 2022-10-31 RX ORDER — CEFAZOLIN SODIUM/WATER 3 G/30 ML
3 SYRINGE (ML) INTRAVENOUS
Status: DISCONTINUED | OUTPATIENT
Start: 2022-10-31 | End: 2022-10-31 | Stop reason: HOSPADM

## 2022-10-31 RX ORDER — SODIUM CHLORIDE, SODIUM LACTATE, POTASSIUM CHLORIDE, CALCIUM CHLORIDE 600; 310; 30; 20 MG/100ML; MG/100ML; MG/100ML; MG/100ML
INJECTION, SOLUTION INTRAVENOUS CONTINUOUS PRN
Status: DISCONTINUED | OUTPATIENT
Start: 2022-10-31 | End: 2022-10-31

## 2022-10-31 RX ORDER — ACETAMINOPHEN 325 MG/1
975 TABLET ORAL ONCE
Status: DISCONTINUED | OUTPATIENT
Start: 2022-10-31 | End: 2022-10-31 | Stop reason: HOSPADM

## 2022-10-31 RX ORDER — CEFAZOLIN SODIUM/WATER 3 G/30 ML
3 SYRINGE (ML) INTRAVENOUS SEE ADMIN INSTRUCTIONS
Status: DISCONTINUED | OUTPATIENT
Start: 2022-10-31 | End: 2022-10-31 | Stop reason: HOSPADM

## 2022-10-31 RX ORDER — SODIUM CHLORIDE, SODIUM LACTATE, POTASSIUM CHLORIDE, CALCIUM CHLORIDE 600; 310; 30; 20 MG/100ML; MG/100ML; MG/100ML; MG/100ML
INJECTION, SOLUTION INTRAVENOUS CONTINUOUS
Status: DISCONTINUED | OUTPATIENT
Start: 2022-10-31 | End: 2022-10-31 | Stop reason: HOSPADM

## 2022-10-31 RX ORDER — PROPOFOL 10 MG/ML
INJECTION, EMULSION INTRAVENOUS PRN
Status: DISCONTINUED | OUTPATIENT
Start: 2022-10-31 | End: 2022-10-31

## 2022-10-31 RX ORDER — OXYCODONE HYDROCHLORIDE 5 MG/1
5 TABLET ORAL
Status: COMPLETED | OUTPATIENT
Start: 2022-10-31 | End: 2022-10-31

## 2022-10-31 RX ORDER — PROPOFOL 10 MG/ML
INJECTION, EMULSION INTRAVENOUS CONTINUOUS PRN
Status: DISCONTINUED | OUTPATIENT
Start: 2022-10-31 | End: 2022-10-31

## 2022-10-31 RX ORDER — FENTANYL CITRATE 0.05 MG/ML
50 INJECTION, SOLUTION INTRAMUSCULAR; INTRAVENOUS EVERY 5 MIN PRN
Status: DISCONTINUED | OUTPATIENT
Start: 2022-10-31 | End: 2022-10-31 | Stop reason: HOSPADM

## 2022-10-31 RX ORDER — KETOROLAC TROMETHAMINE 15 MG/ML
15 INJECTION, SOLUTION INTRAMUSCULAR; INTRAVENOUS EVERY 6 HOURS PRN
Status: DISCONTINUED | OUTPATIENT
Start: 2022-10-31 | End: 2022-10-31 | Stop reason: HOSPADM

## 2022-10-31 RX ORDER — OXYCODONE HYDROCHLORIDE 5 MG/1
5 TABLET ORAL EVERY 6 HOURS PRN
Qty: 12 TABLET | Refills: 0 | Status: SHIPPED | OUTPATIENT
Start: 2022-10-31

## 2022-10-31 RX ORDER — LABETALOL HYDROCHLORIDE 5 MG/ML
10 INJECTION, SOLUTION INTRAVENOUS
Status: DISCONTINUED | OUTPATIENT
Start: 2022-10-31 | End: 2022-10-31 | Stop reason: HOSPADM

## 2022-10-31 RX ORDER — FENTANYL CITRATE 0.05 MG/ML
50 INJECTION, SOLUTION INTRAMUSCULAR; INTRAVENOUS
Status: CANCELLED | OUTPATIENT
Start: 2022-10-31

## 2022-10-31 RX ORDER — SENNA AND DOCUSATE SODIUM 50; 8.6 MG/1; MG/1
1-2 TABLET, FILM COATED ORAL 2 TIMES DAILY
Qty: 20 TABLET | Refills: 0 | Status: SHIPPED | OUTPATIENT
Start: 2022-10-31

## 2022-10-31 RX ORDER — FENTANYL CITRATE 50 UG/ML
INJECTION, SOLUTION INTRAMUSCULAR; INTRAVENOUS PRN
Status: DISCONTINUED | OUTPATIENT
Start: 2022-10-31 | End: 2022-10-31

## 2022-10-31 RX ORDER — ONDANSETRON 2 MG/ML
INJECTION INTRAMUSCULAR; INTRAVENOUS PRN
Status: DISCONTINUED | OUTPATIENT
Start: 2022-10-31 | End: 2022-10-31

## 2022-10-31 RX ORDER — NEOSTIGMINE METHYLSULFATE 1 MG/ML
VIAL (ML) INJECTION PRN
Status: DISCONTINUED | OUTPATIENT
Start: 2022-10-31 | End: 2022-10-31

## 2022-10-31 RX ORDER — ONDANSETRON 2 MG/ML
4 INJECTION INTRAMUSCULAR; INTRAVENOUS EVERY 30 MIN PRN
Status: DISCONTINUED | OUTPATIENT
Start: 2022-10-31 | End: 2022-10-31 | Stop reason: HOSPADM

## 2022-10-31 RX ORDER — LIDOCAINE HYDROCHLORIDE 20 MG/ML
INJECTION, SOLUTION INFILTRATION; PERINEURAL PRN
Status: DISCONTINUED | OUTPATIENT
Start: 2022-10-31 | End: 2022-10-31

## 2022-10-31 RX ORDER — ONDANSETRON 4 MG/1
4 TABLET, ORALLY DISINTEGRATING ORAL EVERY 30 MIN PRN
Status: DISCONTINUED | OUTPATIENT
Start: 2022-10-31 | End: 2022-10-31 | Stop reason: HOSPADM

## 2022-10-31 RX ORDER — DEXAMETHASONE SODIUM PHOSPHATE 4 MG/ML
INJECTION, SOLUTION INTRA-ARTICULAR; INTRALESIONAL; INTRAMUSCULAR; INTRAVENOUS; SOFT TISSUE PRN
Status: DISCONTINUED | OUTPATIENT
Start: 2022-10-31 | End: 2022-10-31

## 2022-10-31 RX ORDER — MEPERIDINE HYDROCHLORIDE 25 MG/ML
12.5 INJECTION INTRAMUSCULAR; INTRAVENOUS; SUBCUTANEOUS
Status: DISCONTINUED | OUTPATIENT
Start: 2022-10-31 | End: 2022-10-31 | Stop reason: HOSPADM

## 2022-10-31 RX ORDER — HYDRALAZINE HYDROCHLORIDE 20 MG/ML
2.5-5 INJECTION INTRAMUSCULAR; INTRAVENOUS EVERY 10 MIN PRN
Status: DISCONTINUED | OUTPATIENT
Start: 2022-10-31 | End: 2022-10-31 | Stop reason: HOSPADM

## 2022-10-31 RX ORDER — HYDROMORPHONE HCL IN WATER/PF 6 MG/30 ML
0.4 PATIENT CONTROLLED ANALGESIA SYRINGE INTRAVENOUS EVERY 5 MIN PRN
Status: DISCONTINUED | OUTPATIENT
Start: 2022-10-31 | End: 2022-10-31 | Stop reason: HOSPADM

## 2022-10-31 RX ORDER — ACETAMINOPHEN 325 MG/1
975 TABLET ORAL ONCE
Status: COMPLETED | OUTPATIENT
Start: 2022-10-31 | End: 2022-10-31

## 2022-10-31 RX ORDER — BUPIVACAINE HYDROCHLORIDE AND EPINEPHRINE 5; 5 MG/ML; UG/ML
INJECTION, SOLUTION PERINEURAL PRN
Status: DISCONTINUED | OUTPATIENT
Start: 2022-10-31 | End: 2022-10-31 | Stop reason: HOSPADM

## 2022-10-31 RX ORDER — KETOROLAC TROMETHAMINE 30 MG/ML
30 INJECTION, SOLUTION INTRAMUSCULAR; INTRAVENOUS EVERY 6 HOURS PRN
Status: DISCONTINUED | OUTPATIENT
Start: 2022-10-31 | End: 2022-10-31

## 2022-10-31 RX ORDER — IBUPROFEN 400 MG/1
800 TABLET, FILM COATED ORAL ONCE
Status: DISCONTINUED | OUTPATIENT
Start: 2022-10-31 | End: 2022-10-31 | Stop reason: HOSPADM

## 2022-10-31 RX ORDER — GLYCOPYRROLATE 0.2 MG/ML
INJECTION, SOLUTION INTRAMUSCULAR; INTRAVENOUS PRN
Status: DISCONTINUED | OUTPATIENT
Start: 2022-10-31 | End: 2022-10-31

## 2022-10-31 RX ADMIN — ROCURONIUM BROMIDE 20 MG: 50 INJECTION, SOLUTION INTRAVENOUS at 11:21

## 2022-10-31 RX ADMIN — ACETAMINOPHEN 975 MG: 325 TABLET ORAL at 09:43

## 2022-10-31 RX ADMIN — PROPOFOL 200 MCG/KG/MIN: 10 INJECTION, EMULSION INTRAVENOUS at 10:42

## 2022-10-31 RX ADMIN — SODIUM CHLORIDE, POTASSIUM CHLORIDE, SODIUM LACTATE AND CALCIUM CHLORIDE: 600; 310; 30; 20 INJECTION, SOLUTION INTRAVENOUS at 12:17

## 2022-10-31 RX ADMIN — ROCURONIUM BROMIDE 50 MG: 50 INJECTION, SOLUTION INTRAVENOUS at 10:42

## 2022-10-31 RX ADMIN — FENTANYL CITRATE 50 MCG: 50 INJECTION, SOLUTION INTRAMUSCULAR; INTRAVENOUS at 13:04

## 2022-10-31 RX ADMIN — FENTANYL CITRATE 50 MCG: 50 INJECTION, SOLUTION INTRAMUSCULAR; INTRAVENOUS at 13:10

## 2022-10-31 RX ADMIN — OXYCODONE HYDROCHLORIDE 5 MG: 5 TABLET ORAL at 13:34

## 2022-10-31 RX ADMIN — PROPOFOL 200 MG: 10 INJECTION, EMULSION INTRAVENOUS at 10:42

## 2022-10-31 RX ADMIN — FENTANYL CITRATE 50 MCG: 50 INJECTION, SOLUTION INTRAMUSCULAR; INTRAVENOUS at 11:05

## 2022-10-31 RX ADMIN — GLYCOPYRROLATE 0.8 MG: 0.2 INJECTION, SOLUTION INTRAMUSCULAR; INTRAVENOUS at 12:15

## 2022-10-31 RX ADMIN — SODIUM CHLORIDE, POTASSIUM CHLORIDE, SODIUM LACTATE AND CALCIUM CHLORIDE: 600; 310; 30; 20 INJECTION, SOLUTION INTRAVENOUS at 10:36

## 2022-10-31 RX ADMIN — SCOPALAMINE 1 PATCH: 1 PATCH, EXTENDED RELEASE TRANSDERMAL at 09:42

## 2022-10-31 RX ADMIN — FENTANYL CITRATE 50 MCG: 50 INJECTION, SOLUTION INTRAMUSCULAR; INTRAVENOUS at 12:53

## 2022-10-31 RX ADMIN — MIDAZOLAM 2 MG: 1 INJECTION INTRAMUSCULAR; INTRAVENOUS at 10:38

## 2022-10-31 RX ADMIN — LIDOCAINE HYDROCHLORIDE 100 MG: 20 INJECTION, SOLUTION INFILTRATION; PERINEURAL at 10:42

## 2022-10-31 RX ADMIN — FENTANYL CITRATE 50 MCG: 50 INJECTION, SOLUTION INTRAMUSCULAR; INTRAVENOUS at 10:42

## 2022-10-31 RX ADMIN — ONDANSETRON 4 MG: 2 INJECTION INTRAMUSCULAR; INTRAVENOUS at 12:16

## 2022-10-31 RX ADMIN — DEXAMETHASONE SODIUM PHOSPHATE 4 MG: 4 INJECTION, SOLUTION INTRA-ARTICULAR; INTRALESIONAL; INTRAMUSCULAR; INTRAVENOUS; SOFT TISSUE at 10:46

## 2022-10-31 RX ADMIN — KETOROLAC TROMETHAMINE 15 MG: 15 INJECTION, SOLUTION INTRAMUSCULAR; INTRAVENOUS at 13:00

## 2022-10-31 RX ADMIN — Medication 3 G: at 10:40

## 2022-10-31 RX ADMIN — NEOSTIGMINE METHYLSULFATE 5 MG: 1 INJECTION, SOLUTION INTRAVENOUS at 12:15

## 2022-10-31 ASSESSMENT — ACTIVITIES OF DAILY LIVING (ADL)
ADLS_ACUITY_SCORE: 35

## 2022-10-31 NOTE — ANESTHESIA POSTPROCEDURE EVALUATION
Patient: Yessy Ryan    Procedure: Procedure(s):  ROBOTIC ASSISTED RIGHT SALPINGO-OOPHORECTOMY, LEFT SALPINGECTOMY, ATTEMPTED  DILATION AND CURETTAGE  OF ENDOMETRIUM  Robotic Assisted Total HYSTERECTOMY       Anesthesia Type:  General    Note:  Disposition: Outpatient   Postop Pain Control: Uneventful            Sign Out: Well controlled pain   PONV: No   Neuro/Psych: Uneventful            Sign Out: Acceptable/Baseline neuro status   Airway/Respiratory: Uneventful            Sign Out: Acceptable/Baseline resp. status   CV/Hemodynamics: Uneventful            Sign Out: Acceptable CV status; No obvious hypovolemia; No obvious fluid overload   Other NRE: NONE   DID A NON-ROUTINE EVENT OCCUR? No           Last vitals:  Vitals Value Taken Time   /87 10/31/22 1314   Temp 36.2  C (97.2  F) 10/31/22 1235   Pulse 65 10/31/22 1328   Resp 19 10/31/22 1328   SpO2 86 % 10/31/22 1329   Vitals shown include unvalidated device data.    Electronically Signed By: Lacey Mendiola MD  October 31, 2022  1:30 PM

## 2022-10-31 NOTE — DISCHARGE INSTRUCTIONS
Discharge Instructions: Using an   Incentive Spirometer    An incentive spirometer is a device that helps you do deep breathing exercises. These exercises will help you breathe better and improve the function of your lungs. The incentive spirometer provides a way for you to take an active part in your recovery.  Follow these steps to use your incentive spirometer:  Inhale normally. Relax and breathe out.  Place your lips tightly around the mouthpiece.  Make sure the device is upright and not tilted.  Breathe in slowly and deeply. Fill your lungs with as much air as you can.   Hold your breath long enough to keep the disk raised for at least 3 seconds while keeping the bead on the right side between the two arrows.   Perform this exercise 10 times every hour while you re awake or as often as your doctor instructs.  If you were also taught coughing exercises, perform them regularly as instructed.       Today you were given 975 mg of Tylenol at 0945 on 10/31/22. The recommended daily maximum dose is 4000 mg.      Today you received Toradol, an antiinflammatory medication similar to Ibuprofen.  You should not take other antiinflammatory medication, such as Ibuprofen, Motrin, Advil, Aleve, Naprosyn, etc until 7 PM.      Same Day Surgery Discharge Instructions for  Sedation and General Anesthesia     It's not unusual to feel dizzy, light-headed or faint for up to 24 hours after surgery or while taking pain medication.  If you have these symptoms: sit for a few minutes before standing and have someone assist you when you get up to walk or use the bathroom.    You should rest and relax for the next 24 hours. We recommend you make arrangements to have an adult stay with you for at least 24 hours after your discharge.  Avoid hazardous and strenuous activity.    DO NOT DRIVE any vehicle or operate mechanical equipment for 24 hours following the end of your surgery.  Even though you may feel normal, your reactions may be  affected by the medication you have received.    Do not drink alcoholic beverages for 24 hours following surgery.     Slowly progress to your regular diet as you feel able. It's not unusual to feel nauseated and/or vomit after receiving anesthesia.  If you develop these symptoms, drink clear liquids (apple juice, ginger ale, broth, 7-up, etc. ) until you feel better.  If your nausea and vomiting persists for 24 hours, please notify your surgeon.      All narcotic pain medications, along with inactivity and anesthesia, can cause constipation. Drinking plenty of liquids and increasing fiber intake will help.    For any questions of a medical nature, call your surgeon.    Do not make important decisions for 24 hours.    If you had general anesthesia, you may have a sore throat for a couple of days related to the breathing tube used during surgery.  You may use Cepacol lozenges to help with this discomfort.  If it worsens or if you develop a fever, contact your surgeon.     If you feel your pain is not well managed with the pain medications prescribed by your surgeon, please contact your surgeon's office to let them know so they can address your concerns.      **If you have questions or concerns about your procedure,   call Dr. Winkler [phone]**

## 2022-10-31 NOTE — OP NOTE
Procedure Date: 10/31/2022    PREOPERATIVE DIAGNOSIS:  Pelvic mass with thickened endometrial stripe.    POSTOPERATIVE DIAGNOSES:   1.  Pelvic mass with thickened endometrial stripe.  2.  Cystadenofibroma of the right ovary.    SURGEON:  Amarilis Winkler MD    ASSISTANT:  Lety Bowers PA-C    ANESTHESIA:  General endotracheal anesthesia.    PROCEDURE:  Attempted D and C, robotic-assisted total laparoscopic hysterectomy, right salpingo-oophorectomy, left salpingectomy.     INDICATIONS FOR PROCEDURE:  The patient is a 52-year-old female who on 07/27/2022 presented to Dr. Luci Lyon for an annual OB/GYN exam. 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  There was a large cystic lesion noted within the right ovary measuring 11.5 x 7.9 x 7.8 with a small nodule, which measured 1.4 cm.  The nodule was avascular and a CA-125 was evaluated on 08/01/2022 and was 15.5 units per mL. She was seen by myself in consultation on 08/23/2022, and we elected to proceed with a D and C, robotic-assisted RSO, left salpingectomy, possible hysterectomy, possible left oophorectomy.    FINDINGS:  The patient was found to have a large predominantly cystic mass replacing her right ovary.  Her left ovary looked normal.  Her cervix was completely stenotic and I could not access the endometrium for sampling, so we did perform a hysterectomy.  Frozen section revealed no neoplastic changes in the uterus and she was found to have a cystadenofibroma of the right ovary.    DESCRIPTION OF PROCEDURE:  The patient was taken to the operating room.  She received broad spectrum antibiotic prophylaxis.  Knee high sequential compression devices were placed on her lower extremities.  She was brought to the operating room and placed in  a supine position on a pink pad on the operating room table.  General endotracheal anesthesia was administered in the usual fashion.  Once intubated, she was repositioned in low lithotomy position using well-padded Yellofin stirrups.  Her arms were padded and held at her side with draw sheets over her arms and hands and held in place with sleds.  Shoulder braces were applied to her shoulder.  A Pettit was placed above her forehead.  A foam donut was placed over her face.  She was prepped and draped in the usual sterile fashion.  A timeout was conducted and everyone agreed upon the procedure.  I had Anesthesia place her in 30 degrees of Trendelenburg and marked out my supraumbilical port sites spaced 8 cm apart at approximately 23 cm above the symphysis pubis.  I also placed a port site above the right anterior superior iliac spine. Lety infiltrated all of those areas with 0.5% Marcaine with epinephrine.  I inserted a Graves speculum into the vagina and visualized the cervix.  I used a single tooth tenaculum at 12 o'clock to hold the cervix steady and I attempted with the smallest of dilators to navigate the endocervical canal, but she was completely stenotic.  At that point, I terminated the attempted D and C.    We placed her supine again and I made a small 8 mm incision 8 cm to the left of the supraumbilical port site.  Through this, I inserted the Veress needle into the abdominal cavity.  Opening pressure was 3 mmHg.  The abdomen was insufflated with carbon dioxide to create a diffuse pneumoperitoneum.  Pressure limits were set and maintained at or below 15 mmHg throughout the trocar insertion of the case.  Once we had started the robotic portion, I dropped the pressure down to 12 mmHg.  With establishment of the pneumoperitoneum, the Veress needle was exchanged for an 8 mm da Matt trocar.  The camera was then introduced through this trocar, confirming intraperitoneal position and showing some omental  adhesions to her supraumbilical incision site.  I had Lety Bowers place an 8 mm da Matt port 8 cm to the right of the presumed supraumbilical port.  We moved the camera to her port and then I placed my lateral port site in place and we moved the camera back to the lateral port site.  I put a 5 mm LigaSure through the medial left port site and took down the omental adhesions.  We then placed our supraumbilical port site and moved the camera to that port site.  She was then placed in 33 degrees of Trendelenburg with gravitational displacement of her bowel into the upper abdomen.  Lety Bowers placed an 8 mm AirSeal port above the right anterior superior iliac spine.  The robot was docked in the usual fashion.  Monopolar scissors were placed in the right upper robotic arm.  A Maryland bipolar was placed in the medial left upper robotic arm.  A ProGrasp was placed in the lateral left robotic arm.  These instruments and the camera were advanced into the pelvis.     The right round ligament was elevated with a ProGrasp and was cauterized with the Maryland bipolar and divided with the monopolar scissors.  I then opened up the posterior broad ligament peritoneum lateral to the ovarian vessels.  I isolated the ovarian vessels at the pelvic brim.  They were cauterized and a wide band with the Maryland bipolar.  I started cauterizing and dividing the soft tissues behind the round ligament and then came across the uteroovarian ligament and the fallopian tube, releasing the ovary from of the uterus so the uterus could be more mobile.  I divided the vesicouterine peritoneum.  I went to the left side.  I cauterized and divided the round ligament.  I opened up the posterior broad ligament and created a defect under the proximal adnexal structures.  I grasped the residual round ligament on the uterine side.  Lety grasp the fallopian tube and I cauterized and divided the mesosalpinx all the way to the uterus and then came  across the uteroovarian ligament, which was cauterized and divided as well.  The bladder was taken down off the anterior surface of the cervix and upper vagina.  The soft tissues at the isthmus bilaterally were cauterized with the Maryland bipolar and divided with the monopolar scissors.  I then cauterized the main trunk of the uterine artery on either side and did it on both sides prior to coming across it on the right side and dividing the tissues free of the cervix.  I then went down the cardinal ligament, cauterizing and dividing the soft tissues free of the cervix down to and including the uterosacral ligament.  I then repeated on the left hand side.  Lety Bowers introduced an EEA sizer into the anterior fornix making sure the bladder was adequately down.  I made an anterior colpotomy at the cervicovaginal junction and circumferentially divided the cervix free of the vagina.  Lety Bowers brought in a single tooth tenaculum, grabbed the cervix.  Cervix, uterus and left fallopian tube were removed and sent to pathology.  She then introduced a 15 mm anchor bag.  Prior to that, I had cauterized the ovarian vessels and elevated the mass superiorly so Lety could bring the bag underneath. We negotiated the ovary and tube into the bag and then she cinched it down and brought it out through the vagina.  Once we had exteriorized and we could see the mass at the top of the vagina, Lety used the scissors to open that up and a large amount of fluid came out and the mass reduced nicely and was delivered in the bag.  I did evaluate it and she had some excrescences on the inside that were fairly firm.  This was the pathology as well.     While awaiting pathology, we closed the vaginal cuff.  We did this with large da Matt needle  and the Maryland bipolar.  We did anchoring sutures of 0 PDS at 3 and 9 o'clock and then did a running full thickness anterior to posterior vaginal closure incorporating the  cul-de-sac peritoneum and tying the 2 sutures together when they reached the midline.  Lety irrigated the pelvis out.  Pathology called stating there was no neoplasia in the uterus and that it was a cystadenofibroma of the right ovary.  We removed the robotic instruments.  The robot was undocked.  The pneumoperitoneum was allowed to dissipate and the trocars were removed intact.  The incision sites were closed with 4-0 Monocryl in an interrupted fashion to reapproximate the subcutaneous tissue and 4-0 Monocryl in a subcuticular fashion to reapproximate the skin.  Benzoin was placed around the incision.  Steri-Strips laid over the incisions.  The EEA sizer was removed from her vagina.  Her anesthesia was reversed.  She was extubated and taken to recovery room in stable condition.    ESTIMATED BLOOD LOSS FOR PROCEDURE:  10 mL    Lety Bowers was my primary assist.  She helped me with all aspects of the case, including trocar placement, docking the robot, placement of the robotic instruments and their exchange during the case.  She assisted through the accessory port site, providing necessary countertraction, optimizing visualization and suctioning and irrigating when necessary.  She was instrumental in specimen retrieval.  She helped with cuff closure and eventual undocking the robot and port site closure.    Amarilis Winkler MD        D: 10/31/2022   T: 10/31/2022   MT: SOPHIA    Name:     NORMAN LIM  MRN:      [id number]        Account:        [id number]   :      1970           Procedure Date: 10/31/2022     Document: [id number]    cc:  Priya Lyon MD

## 2022-10-31 NOTE — ANESTHESIA PREPROCEDURE EVALUATION
Anesthesia Pre-Procedure Evaluation    Patient: Yessy Ryan   MRN: [id number] : 1970        Procedure : Procedure(s):  ROBOTIC ASSISTED RIGHT SALPINGO-OOPHORECTOMY, LEFT SALPINGECTOMY, POSSIBLE LEFT OOPHORECTOMY, DILATION AND CURETTAGE  OF ENDOMETRIUM  POSSIBLE HYSTERECTOMY, POSSIBLE STAGING          Past Medical History:   Diagnosis Date     Arthralgia      Enthesopathy      Major depressive disorder, recurrent episode, mild (H)      Polyarthritis      Sleep apnea      Uncomplicated asthma       Past Surgical History:   Procedure Laterality Date     GYN SURGERY      conization cervix knife/laser leep     ORTHOPEDIC SURGERY      ORIFright foot fracture/ORIF right wrist fracture/hardware removed from right foot     TONSILLECTOMY & ADENOIDECTOMY       WISDOM TOOTH EXTRACTION        Allergies   Allergen Reactions     Alcohol Hives     Morphine Hives     Percocet [Oxycodone-Acetaminophen] Nausea and Vomiting     Tetrahydrocannabinol (Thc Non-Synthetic) [Tetrahydrocannabinol] Anxiety, Dizziness and Nausea and Vomiting      Social History     Tobacco Use     Smoking status: Never     Smokeless tobacco: Never   Substance Use Topics     Alcohol use: Not Currently      Wt Readings from Last 1 Encounters:   22 145.2 kg (320 lb)        Anesthesia Evaluation   Pt has had prior anesthetic.     History of anesthetic complications  - PONV.      ROS/MED HX  ENT/Pulmonary:     (+) sleep apnea, doesn't use CPAP, asthma Treatment: Inhaler daily,      Neurologic:  - neg neurologic ROS     Cardiovascular:  - neg cardiovascular ROS  (-) hypertension   METS/Exercise Tolerance:     Hematologic:       Musculoskeletal:   (+) arthritis,     GI/Hepatic:     (+) GERD, Asymptomatic on medication,     Renal/Genitourinary:  - neg Renal ROS     Endo:     (+) Obesity,  (-) Type II DM   Psychiatric/Substance Use:     (+) psychiatric history depression     Infectious Disease:       Malignancy:       Other:            Physical  Exam    Airway        Mallampati: II   TM distance: > 3 FB   Neck ROM: full   Mouth opening: > 3 cm    Respiratory Devices and Support         Dental  no notable dental history         Cardiovascular   cardiovascular exam normal          Pulmonary   pulmonary exam normal                OUTSIDE LABS:  CBC:   Lab Results   Component Value Date    WBC 7.0 09/27/2022    WBC 6.6 07/23/2022    HGB 13.3 09/27/2022    HGB 11.9 07/23/2022    HCT 42.7 09/27/2022    HCT 38.9 07/23/2022     09/27/2022     07/23/2022     BMP:   Lab Results   Component Value Date     09/27/2022     07/23/2022    POTASSIUM 4.1 09/27/2022    POTASSIUM 4.7 07/23/2022    CHLORIDE 105 09/27/2022    CHLORIDE 108 07/23/2022    CO2 26 09/27/2022    CO2 29 07/23/2022    BUN 16 09/27/2022    BUN 21 07/23/2022    CR 1.00 09/27/2022    CR 1.22 (H) 07/23/2022     (H) 09/27/2022     (H) 07/23/2022     COAGS: No results found for: PTT, INR, FIBR  POC: No results found for: BGM, HCG, HCGS  HEPATIC:   Lab Results   Component Value Date    ALBUMIN 3.7 09/27/2022    PROTTOTAL 7.8 09/27/2022    ALT 31 09/27/2022    AST 26 09/27/2022    ALKPHOS 97 09/27/2022    BILITOTAL 0.8 09/27/2022     OTHER:   Lab Results   Component Value Date    CAMERON 9.2 09/27/2022       Anesthesia Plan    ASA Status:  2   NPO Status:  NPO Appropriate    Anesthesia Type: General.     - Airway: ETT   Induction: Intravenous, Propofol.   Maintenance: Balanced.   Techniques and Equipment:     - Airway: Video-Laryngoscope         Consents    Anesthesia Plan(s) and associated risks, benefits, and realistic alternatives discussed. Questions answered and patient/representative(s) expressed understanding.    - Discussed:     - Discussed with:  Patient         Postoperative Care    Pain management: IV analgesics.   PONV prophylaxis: Ondansetron (or other 5HT-3), Background Propofol Infusion, Dexamethasone or Solumedrol, Scopolamine patch     Comments:                 Lacey Mendiola MD

## 2022-10-31 NOTE — ANESTHESIA CARE TRANSFER NOTE
Patient: Yessy Ryan    Procedure: Procedure(s):  ROBOTIC ASSISTED RIGHT SALPINGO-OOPHORECTOMY, LEFT SALPINGECTOMY, ATTEMPTED  DILATION AND CURETTAGE  OF ENDOMETRIUM  Robotic Assisted Total HYSTERECTOMY       Diagnosis: Abdominal swelling, right lower quadrant [R19.03]  Endometrial disorder [N85.9]  Diagnosis Additional Information: No value filed.    Anesthesia Type:   General     Note:    Oropharynx: oropharynx clear of all foreign objects  Level of Consciousness: awake  Oxygen Supplementation: face mask  Level of Supplemental Oxygen (L/min / FiO2): 6  Independent Airway: airway patency satisfactory and stable  Dentition: dentition unchanged      Patient transferred to: PACU    Handoff Report: Identifed the Patient, Identified the Reponsible Provider, Reviewed the pertinent medical history, Discussed the surgical course, Reviewed Intra-OP anesthesia mangement and issues during anesthesia, Set expectations for post-procedure period and Allowed opportunity for questions and acknowledgement of understanding      Vitals:  Vitals Value Taken Time   /99 10/31/22 1300   Temp 36.2  C (97.2  F) 10/31/22 1235   Pulse 73 10/31/22 1310   Resp 19 10/31/22 1310   SpO2 95 % 10/31/22 1310   Vitals shown include unvalidated device data.    Electronically Signed By: LAURO Cook CRNA  October 31, 2022  1:12 PM

## 2022-10-31 NOTE — BRIEF OP NOTE
[facility]    Brief Operative Note    Pre-operative diagnosis: Abdominal swelling, right lower quadrant [R19.03]  Endometrial disorder [N85.9]  Post-operative diagnosis Same as pre-operative diagnosis    Procedure: Procedure(s):  ROBOTIC ASSISTED RIGHT SALPINGO-OOPHORECTOMY, LEFT SALPINGECTOMY, ATTEMPTED  DILATION AND CURETTAGE  OF ENDOMETRIUM  Robotic Assisted Total HYSTERECTOMY  Surgeon: Surgeon(s) and Role:     * Amarilis Winkler MD - Primary  Anesthesia: General   Estimated Blood Loss: 25mL    Drains: None  Specimens:   ID Type Source Tests Collected by Time Destination   1 : UTERUS, CERVIX, and LEFT FALLOPIAN TUBE Tissue Uterus SURGICAL PATHOLOGY EXAM Amarilis Winkler MD 10/31/2022 11:14 AM    2 :  right ovary and fallopian tube - ruptured in bag Tissue Ovary and Fallopian Tube, Right SURGICAL PATHOLOGY EXAM Amarilis Winkler MD 10/31/2022 11:34 AM      Findings:   Moderate adhesions periumbilicus. Right ovary with large cyst, inital pathology serous cystadenoma. Left ovary normal. Unable to enter uterine cavity on attempted D&C, hysterectomy completed. No gross lesions..  Complications: None.  Implants: * No implants in log *

## 2022-11-01 LAB
PATH REPORT.COMMENTS IMP SPEC: NORMAL
PATH REPORT.COMMENTS IMP SPEC: NORMAL
PATH REPORT.FINAL DX SPEC: NORMAL
PATH REPORT.GROSS SPEC: NORMAL
PATH REPORT.INTRAOP OBS SPEC DOC: NORMAL
PATH REPORT.MICROSCOPIC SPEC OTHER STN: NORMAL
PATH REPORT.RELEVANT HX SPEC: NORMAL
PHOTO IMAGE: NORMAL

## 2023-02-12 ENCOUNTER — HEALTH MAINTENANCE LETTER (OUTPATIENT)
Age: 53
End: 2023-02-12

## 2024-04-13 ENCOUNTER — HEALTH MAINTENANCE LETTER (OUTPATIENT)
Age: 54
End: 2024-04-13

## 2025-03-02 ENCOUNTER — HEALTH MAINTENANCE LETTER (OUTPATIENT)
Age: 55
End: 2025-03-02

## 2025-04-19 ENCOUNTER — HEALTH MAINTENANCE LETTER (OUTPATIENT)
Age: 55
End: 2025-04-19

## (undated) DEVICE — ENDO TROCAR CONMED AIRSEAL BLADELESS 05X120MM IAS5-120LP

## (undated) DEVICE — LINEN TOWEL PACK X5 5464

## (undated) DEVICE — DAVINCI HOT SHEARS TIP COVER  400180

## (undated) DEVICE — NDL INSUFFLATION 13GA 120MM C2201

## (undated) DEVICE — SPONGE RAY-TEC 4X4" 7317

## (undated) DEVICE — DAVINCI XI DRAPE ARM 470015

## (undated) DEVICE — DAVINCI XI DRAPE COLUMN 470341

## (undated) DEVICE — SUCTION CANISTER MEDIVAC LINER 3000ML W/LID 65651-530

## (undated) DEVICE — SPONGE LAP 18X18" X8435

## (undated) DEVICE — POUCH TISSUE RETRIEVAL 15MM 6.00" INTRO TRS190SB2

## (undated) DEVICE — PACK DAVINCI GYN SMA15GDFS1

## (undated) DEVICE — ESU LIGASURE LAPAROSCOPIC BLUNT TIP SEALER 5MMX37CM LF1837

## (undated) DEVICE — SOL ADH LIQUID BENZOIN SWAB 0.6ML C1544

## (undated) DEVICE — DRAPE CV SPLIT II 147X106" 9158

## (undated) DEVICE — SYSTEM LAPAROVUE VISIBILITY LAPVUE10

## (undated) DEVICE — SYR 10ML FINGER CONTROL W/O NDL 309695

## (undated) DEVICE — SOL WATER IRRIG 1000ML BOTTLE 2F7114

## (undated) DEVICE — GLOVE BIOGEL PI ULTRATOUCH G SZ 6.5 42165

## (undated) DEVICE — SUCTION IRR STRYKERFLOW II W/TIP 250-070-520

## (undated) DEVICE — SOL NACL 0.9% IRRIG 1000ML BOTTLE 2F7124

## (undated) DEVICE — SU PDS II 0 CT-2 27" Z334H

## (undated) DEVICE — TUBING CONMED AIRSEAL SMOKE EVAC INSUFFLATION ASM-EVAC

## (undated) DEVICE — SU MONOCRYL 4-0 PS-2 18" UND Y496G

## (undated) DEVICE — DRSG STERI STRIP 1X5" R1548

## (undated) DEVICE — DAVINCI XI OBTURATOR BLADELESS 8MM 470359

## (undated) DEVICE — SOL NACL 0.9% INJ 1000ML BAG 2B1324X

## (undated) DEVICE — DAVINCI XI SEAL UNIVERSAL 5-8MM 470361

## (undated) DEVICE — ESU GROUND PAD UNIVERSAL W/O CORD

## (undated) DEVICE — KIT PATIENT POSITIONING PIGAZZI LATEX FREE 40580

## (undated) RX ORDER — ONDANSETRON 2 MG/ML
INJECTION INTRAMUSCULAR; INTRAVENOUS
Status: DISPENSED
Start: 2022-10-31

## (undated) RX ORDER — PROPOFOL 10 MG/ML
INJECTION, EMULSION INTRAVENOUS
Status: DISPENSED
Start: 2022-10-31

## (undated) RX ORDER — DEXAMETHASONE SODIUM PHOSPHATE 4 MG/ML
INJECTION, SOLUTION INTRA-ARTICULAR; INTRALESIONAL; INTRAMUSCULAR; INTRAVENOUS; SOFT TISSUE
Status: DISPENSED
Start: 2022-10-31

## (undated) RX ORDER — HYDROMORPHONE HYDROCHLORIDE 1 MG/ML
INJECTION, SOLUTION INTRAMUSCULAR; INTRAVENOUS; SUBCUTANEOUS
Status: DISPENSED
Start: 2022-10-31

## (undated) RX ORDER — FENTANYL CITRATE 0.05 MG/ML
INJECTION, SOLUTION INTRAMUSCULAR; INTRAVENOUS
Status: DISPENSED
Start: 2022-10-31

## (undated) RX ORDER — LIDOCAINE HYDROCHLORIDE 20 MG/ML
INJECTION, SOLUTION EPIDURAL; INFILTRATION; INTRACAUDAL; PERINEURAL
Status: DISPENSED
Start: 2022-10-31

## (undated) RX ORDER — ACETAMINOPHEN 325 MG/1
TABLET ORAL
Status: DISPENSED
Start: 2022-10-31

## (undated) RX ORDER — OXYCODONE HYDROCHLORIDE 5 MG/1
TABLET ORAL
Status: DISPENSED
Start: 2022-10-31

## (undated) RX ORDER — FENTANYL CITRATE 50 UG/ML
INJECTION, SOLUTION INTRAMUSCULAR; INTRAVENOUS
Status: DISPENSED
Start: 2022-10-31

## (undated) RX ORDER — CEFAZOLIN SODIUM/WATER 2 G/20 ML
SYRINGE (ML) INTRAVENOUS
Status: DISPENSED
Start: 2022-10-31

## (undated) RX ORDER — SCOLOPAMINE TRANSDERMAL SYSTEM 1 MG/1
PATCH, EXTENDED RELEASE TRANSDERMAL
Status: DISPENSED
Start: 2022-10-31

## (undated) RX ORDER — BUPIVACAINE HYDROCHLORIDE 5 MG/ML
INJECTION, SOLUTION EPIDURAL; INTRACAUDAL
Status: DISPENSED
Start: 2022-10-31

## (undated) RX ORDER — KETOROLAC TROMETHAMINE 15 MG/ML
INJECTION, SOLUTION INTRAMUSCULAR; INTRAVENOUS
Status: DISPENSED
Start: 2022-10-31

## (undated) RX ORDER — EPINEPHRINE 1 MG/ML
INJECTION, SOLUTION INTRAMUSCULAR; SUBCUTANEOUS
Status: DISPENSED
Start: 2022-10-31

## (undated) RX ORDER — CEFAZOLIN SODIUM 1 G/3ML
INJECTION, POWDER, FOR SOLUTION INTRAMUSCULAR; INTRAVENOUS
Status: DISPENSED
Start: 2022-10-31